# Patient Record
Sex: FEMALE | URBAN - METROPOLITAN AREA
[De-identification: names, ages, dates, MRNs, and addresses within clinical notes are randomized per-mention and may not be internally consistent; named-entity substitution may affect disease eponyms.]

---

## 2024-05-22 ENCOUNTER — DOCUMENTATION ONLY (OUTPATIENT)
Dept: ADMINISTRATIVE | Facility: OTHER | Age: 69
End: 2024-05-22

## 2024-05-22 NOTE — PROGRESS NOTES
RADIATION ONCOLOGY CONSULTATION  [facility]      NAME: Nidhi Tsang    : 1955 SEX: F MR#: [id number]   DIAGNOSIS: Encapsulated papillary carcinoma   REFERRING PHYSICIAN: Reji Naranjo M.D.   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  The patient is being seen in consultation for consideration of radiotherapy at the request of Dr. Naranjo.     Bilateral screening mammograms on 2023, showed no evidence of malignancy.  Bilateral screening mammograms on 2024, showed what appeared to be a right axillary lymph node measuring 2.1 cm.  There were no suspicious microcalcifications.  There were small left-sided intramammary nodes noted.     A right-sided ultrasound on 2024, showed a 2.2 x 1.6 x 2.4 cm hypoechoic solid lesion in the right upper outer quadrant.  There was a 2.9 x 0.7 x 1.9 cm axillary lymph node with normal appearance.     She saw Dr. Naranjo of Surgery on 2024, who recommended a biopsy.  On 2024, the patient underwent an ultrasound-guided core needle biopsy of the right breast with clip placement.  Pathology demonstrated benign breast tissue with usual ductal hyperplasia and early intraductal papilloma.  There was focal fat necrosis noted; however, no atypia or malignancy.     She saw Dr. Naranjo again on 2024, who recommended a lumpectomy.  On 2024, she underwent a needle localization right-sided lumpectomy by Dr. Naranjo.  The primary specimen measured 5.7 x 4.2 x 3 cm and revealed 1.8 cm of grade I encapsulated papillary carcinoma.  The closest margin was 5 mm superiorly.  The tumor was estrogen 100% positive and progesterone receptor 100% positive.     She saw   Marcella on 2024, who felt that she was doing well postoperatively.  It was noted that there was no need for a sentinel lymph node biopsy, but that she would require radiation, and was referred to Medical Oncology and Radiation Oncology.  She was seen by Dr. Isidro of Medical Oncology earlier this morning, with the recommendation for adjuvant tamoxifen.    REVIEW OF SYSTEMS:  She reports minimal postoperative soreness in the right breast which continues to improve; however, denies any new breast masses, rashes, nipple discharge or axillary lymphadenopathy.  She denies any high fevers, shaking chills, drenching night sweats or recent weight loss.     She denies any recent changes in vision, hearing or swallowing, shortness of breath at rest, dyspnea on exertion, cough, chest pain, abdominal pain, nausea, vomiting, constipation, diarrhea, bright-red blood per rectum or urinary symptoms.  She denies any focal numbness, tingling, weakness, severe headaches, diplopia or ataxia.  All other systems reviewed and negative.    PAST MEDICAL HISTORY:  Papillary carcinoma as above, hypercholesterolemia, hypertension, diabetes mellitus, osteoarthritis, seasonal allergies.    PAST SURGICAL HISTORY:  Status post appendectomy, status post total abdominal hysterectomy, status post tubal ligation, status post right-sided lumpectomy as above.    PAST GYNECOLOGIC HISTORY:  .  Menarche at the age of 11.  First live birth at the age of 17.  She did not breast feed.  She does not know the age she underwent menopause.  No history of oral contraceptive pill or hormone replacement therapy.    PAST RADIATION THERAPY:  None.    MEDICATIONS:  Albuterol; aspirin; Lipitor; chlorthalidone; Jardiance; Zetia; iron sulfate; Neurontin; Xyzal; Mobic; metformin; Benicar; propranolol; propylthiouracil; Januvia; Lamisil; Spiriva.    ALLERGIES:  No known drug allergies.    FAMILY HISTORY:  Maternal aunt with breast cancer.  No family history of  ovarian cancer.    SOCIAL HISTORY:  The patient reports smoking one-half pack per day for 38 years, and is currently smoking one-half pack per day.  She does not drink alcohol and denies illicit drug use.  She lives in [place], is  and has seven children.  She is a housewife.    PHYSICAL EXAMINATION:  VITAL SIGNS:  Blood pressure 144/67, heart rate 79, temperature 98.6, height 5 feet 7 inches, weight 201 pounds, oxygen saturation 94% on room air.  ECOG score of 0-1.   GENERAL:  The patient is a pleasant well-nourished, well-developed  female in no acute distress.   HEENT:  Normocephalic, atraumatic.  Extraocular muscles intact.  Pupils equally round and reactive to light.  Sclerae anicteric.  Oral cavity and oropharynx are clear without erythema, exudate, masses or ulcerations.   NECK:  Supple without lymphadenopathy or thyromegaly.   HEART:  Regular rate and rhythm.  Normal S1, S2.  No murmurs, gallops or rubs.   LUNGS:  Distant breath sounds bilaterally.  No wheezes, rhonchi or rales.   BACK:  No spinal or costovertebral angle tenderness.   ABDOMEN:  Soft, nontender and nondistended.  There is a reducible left-sided abdominal hernia.   EXTREMITIES:  Warm and well perfused.  No clubbing, cyanosis or edema.   NEUROLOGIC:  Cranial nerves two through 12 grossly intact.  Motor and sensory intact bilaterally.  Finger-nose-finger and gait within normal limits.  1- patellar deep tendon reflexes bilaterally.  No clonus.   BREASTS:  The left breast is soft and nontender without palpable masses or axillary lymphadenopathy.  The right breast demonstrates a healing 5 cm curvilinear lumpectomy scar from the 9 to 10:30 position, 9 cm from the nipple, with an underlying mobile minimally tender seroma nearing approximately 5 x 7 cm. There are otherwise no suspicious masses, rashes, nipple discharge, peau d'orange, skin retraction or axillary lymphadenopathy.    LABORATORIES:  On 05/07/2024, white blood  cells 6.2, hemoglobin 14.0, hematocrit 42.5, platelets 255.    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     Based upon this patient's history and presentation, I believe that she is likely an appropriate candidate for external beam radiation therapy.  I explained to her that encapsulated papillary carcinoma is a relatively rare entity for primarily the case reports.  She is aware that due to the rarity there are no evidence-based guidelines for management of encapsulated papillary carcinoma specifically; however, it is typically considered a subtype ductal carcinoma in situ and is typically treated as such.     With regard to surgery, the patient has undergone a lumpectomy with adequate negative surgical margins, for which she appears to be healing well.  Given that no invasive component was identified in the tumor, there should be no role for an additional sentinel lymph node biopsy.     The patient met with Dr. Isidro of Medical Oncology earlier this morning.  She understands that there is no role for cytotoxic chemotherapy in this disease; however, would likely benefit from adjuvant tamoxifen following radiation given her estrogen receptor positivity as this has been demonstrated to further reduce local recurrence.     I explained to the patient and her  that the role of external beam radiation therapy in the setting of breast cancer, typically DCIS, is to reduce the risk of local and/or regional recurrence by approximately one-half to two-thirds.  In her particular case, I would estimate her risk of recurrence to be on the order of approximately 20% and likely not greater than 25% with the goal of radiation to reduce this to 5% to under 10% range.  I feel that she would be an appropriate candidate for a  shortened course of hypofractionated external beam radiation therapy over approximately three and a half to four weeks, as opposed to the standard five and a half to six and a half weeks, to be initiated after she has had adequate time for healing postoperatively, which I would anticipate to be approximately one month from surgery.     The risks, benefits, side effects, alternatives to, indications for and mechanisms of external beam radiation were explained in full to the patient.  She and her  who accompanied her to today's visit asked multiple appropriate questions, all of which were answered to their apparent satisfaction. This conversation included a discussion of possible short-term side effects, including but not limited to, dermatitis with skin tenderness, erythema, pruritus, possible desquamation, local hair loss and fatigue.      We also discussed the possible long-term side effects, including but not limited to residual hyperpigmentation, telangiectasias, the change in the shape, size and/or consistency of the breast, very low risk of ipsilateral rib fracture with trauma, very low risk of pneumonitis and/or pulmonary fibrosis, and less than 0.5% risk of secondary tumor formation over decades.  She will not be at increased risk for cardiac sequelae as this is a right-sided tumor.  She agreed with the proposed treatment plan and informed consent was obtained.     Ms. Tsang has been scheduled to return to [facility] later this week to undergo CT-guided simulation in the supine treatment position with the use of a tilt board and Vac-Yousuf for custom immobilization.  After a customized treatment plan has been designed, she would undergo verification films and subsequently begin a course of forward-planned hypofractionated 3D conformal radiotherapy to the right breast.     Thank you very much, Dr. Naranjo, for this consultation and the opportunity to participate in the care of this  patient.  Please do not hesitate to contact me should you have any questions, concerns and/or require additional information.        KAREN Cazares MD